# Patient Record
Sex: FEMALE | Race: WHITE | URBAN - METROPOLITAN AREA
[De-identification: names, ages, dates, MRNs, and addresses within clinical notes are randomized per-mention and may not be internally consistent; named-entity substitution may affect disease eponyms.]

---

## 2022-09-05 ENCOUNTER — EMERGENCY (EMERGENCY)
Facility: HOSPITAL | Age: 47
LOS: 1 days | Discharge: HOME | End: 2022-09-05
Attending: EMERGENCY MEDICINE
Payer: COMMERCIAL

## 2022-09-05 VITALS
DIASTOLIC BLOOD PRESSURE: 83 MMHG | OXYGEN SATURATION: 97 % | HEART RATE: 114 BPM | SYSTOLIC BLOOD PRESSURE: 137 MMHG | TEMPERATURE: 99 F | RESPIRATION RATE: 18 BRPM

## 2022-09-05 LAB
A1C WITH ESTIMATED AVERAGE GLUCOSE RESULT: 11.5 % — HIGH (ref 4–5.6)
ALBUMIN SERPL ELPH-MCNC: 3.5 G/DL — SIGNIFICANT CHANGE UP (ref 3.3–5.2)
ALP SERPL-CCNC: 76 U/L — SIGNIFICANT CHANGE UP (ref 40–120)
ALT FLD-CCNC: 12 U/L — SIGNIFICANT CHANGE UP
ANION GAP SERPL CALC-SCNC: 18 MMOL/L — HIGH (ref 5–17)
APTT BLD: 27.6 SEC — SIGNIFICANT CHANGE UP (ref 27.5–35.5)
AST SERPL-CCNC: 15 U/L — SIGNIFICANT CHANGE UP
BASOPHILS # BLD AUTO: 0.06 K/UL — SIGNIFICANT CHANGE UP (ref 0–0.2)
BASOPHILS NFR BLD AUTO: 0.5 % — SIGNIFICANT CHANGE UP (ref 0–2)
BILIRUB SERPL-MCNC: 1.2 MG/DL — SIGNIFICANT CHANGE UP (ref 0.4–2)
BUN SERPL-MCNC: 14.7 MG/DL — SIGNIFICANT CHANGE UP (ref 8–20)
CALCIUM SERPL-MCNC: 8.9 MG/DL — SIGNIFICANT CHANGE UP (ref 8.4–10.5)
CHLORIDE SERPL-SCNC: 92 MMOL/L — LOW (ref 98–107)
CO2 SERPL-SCNC: 19 MMOL/L — LOW (ref 22–29)
CREAT SERPL-MCNC: 0.75 MG/DL — SIGNIFICANT CHANGE UP (ref 0.5–1.3)
D DIMER BLD IA.RAPID-MCNC: 252 NG/ML DDU — HIGH
EGFR: 99 ML/MIN/1.73M2 — SIGNIFICANT CHANGE UP
EOSINOPHIL # BLD AUTO: 0.08 K/UL — SIGNIFICANT CHANGE UP (ref 0–0.5)
EOSINOPHIL NFR BLD AUTO: 0.7 % — SIGNIFICANT CHANGE UP (ref 0–6)
ESTIMATED AVERAGE GLUCOSE: 283 MG/DL — HIGH (ref 68–114)
GLUCOSE SERPL-MCNC: 345 MG/DL — HIGH (ref 70–99)
HCT VFR BLD CALC: 41.2 % — SIGNIFICANT CHANGE UP (ref 34.5–45)
HGB BLD-MCNC: 14.2 G/DL — SIGNIFICANT CHANGE UP (ref 11.5–15.5)
IMM GRANULOCYTES NFR BLD AUTO: 1.3 % — SIGNIFICANT CHANGE UP (ref 0–1.5)
INR BLD: 1.22 RATIO — HIGH (ref 0.88–1.16)
LIDOCAIN IGE QN: 13 U/L — LOW (ref 22–51)
LYMPHOCYTES # BLD AUTO: 1.31 K/UL — SIGNIFICANT CHANGE UP (ref 1–3.3)
LYMPHOCYTES # BLD AUTO: 11 % — LOW (ref 13–44)
MCHC RBC-ENTMCNC: 29.3 PG — SIGNIFICANT CHANGE UP (ref 27–34)
MCHC RBC-ENTMCNC: 34.5 GM/DL — SIGNIFICANT CHANGE UP (ref 32–36)
MCV RBC AUTO: 84.9 FL — SIGNIFICANT CHANGE UP (ref 80–100)
MONOCYTES # BLD AUTO: 0.78 K/UL — SIGNIFICANT CHANGE UP (ref 0–0.9)
MONOCYTES NFR BLD AUTO: 6.5 % — SIGNIFICANT CHANGE UP (ref 2–14)
NEUTROPHILS # BLD AUTO: 9.52 K/UL — HIGH (ref 1.8–7.4)
NEUTROPHILS NFR BLD AUTO: 80 % — HIGH (ref 43–77)
PLATELET # BLD AUTO: 195 K/UL — SIGNIFICANT CHANGE UP (ref 150–400)
POTASSIUM SERPL-MCNC: 3.8 MMOL/L — SIGNIFICANT CHANGE UP (ref 3.5–5.3)
POTASSIUM SERPL-SCNC: 3.8 MMOL/L — SIGNIFICANT CHANGE UP (ref 3.5–5.3)
PROT SERPL-MCNC: 7.1 G/DL — SIGNIFICANT CHANGE UP (ref 6.6–8.7)
PROTHROM AB SERPL-ACNC: 14.2 SEC — HIGH (ref 10.5–13.4)
RBC # BLD: 4.85 M/UL — SIGNIFICANT CHANGE UP (ref 3.8–5.2)
RBC # FLD: 12.9 % — SIGNIFICANT CHANGE UP (ref 10.3–14.5)
SODIUM SERPL-SCNC: 129 MMOL/L — LOW (ref 135–145)
TROPONIN T SERPL-MCNC: <0.01 NG/ML — SIGNIFICANT CHANGE UP (ref 0–0.06)
WBC # BLD: 11.91 K/UL — HIGH (ref 3.8–10.5)
WBC # FLD AUTO: 11.91 K/UL — HIGH (ref 3.8–10.5)

## 2022-09-05 PROCEDURE — 73501 X-RAY EXAM HIP UNI 1 VIEW: CPT | Mod: 26,RT

## 2022-09-05 PROCEDURE — 99285 EMERGENCY DEPT VISIT HI MDM: CPT

## 2022-09-05 PROCEDURE — 93010 ELECTROCARDIOGRAM REPORT: CPT

## 2022-09-05 RX ORDER — ONDANSETRON 8 MG/1
8 TABLET, FILM COATED ORAL ONCE
Refills: 0 | Status: COMPLETED | OUTPATIENT
Start: 2022-09-05 | End: 2022-09-05

## 2022-09-05 RX ORDER — SODIUM CHLORIDE 9 MG/ML
1000 INJECTION INTRAMUSCULAR; INTRAVENOUS; SUBCUTANEOUS ONCE
Refills: 0 | Status: COMPLETED | OUTPATIENT
Start: 2022-09-05 | End: 2022-09-05

## 2022-09-05 RX ORDER — ACETAMINOPHEN 500 MG
650 TABLET ORAL ONCE
Refills: 0 | Status: COMPLETED | OUTPATIENT
Start: 2022-09-05 | End: 2022-09-05

## 2022-09-05 RX ADMIN — Medication 650 MILLIGRAM(S): at 22:19

## 2022-09-05 RX ADMIN — Medication 600 MILLIGRAM(S): at 22:55

## 2022-09-05 RX ADMIN — Medication 650 MILLIGRAM(S): at 23:19

## 2022-09-05 RX ADMIN — Medication 100 MILLIGRAM(S): at 22:19

## 2022-09-05 RX ADMIN — SODIUM CHLORIDE 1000 MILLILITER(S): 9 INJECTION INTRAMUSCULAR; INTRAVENOUS; SUBCUTANEOUS at 23:35

## 2022-09-05 NOTE — ED PROVIDER NOTE - PATIENT PORTAL LINK FT
You can access the FollowMyHealth Patient Portal offered by [facility] by registering at the following website: [email or web address]. By joining [employer]’s FollowMyHealth portal, you will also be able to view your health information using other applications (apps) compatible with our system.

## 2022-09-05 NOTE — ED ADULT NURSE NOTE - OBJECTIVE STATEMENT
Pt reports to ED with cc of chest discomfort, states she has this fluttery feeling in her heart that happens once in awhile. Pt also reports redness and painful bump on her right labia majora x 2 days. AOX4

## 2022-09-05 NOTE — ED PROVIDER NOTE - CLINICAL SUMMARY MEDICAL DECISION MAKING FREE TEXT BOX
labial abscess was evaluated surgery and obgyn. Clindamycin IV 1st dose was performed in the ED. Vital stable. Will discharge with bactrim po, f/u with obgy.

## 2022-09-05 NOTE — CONSULT NOTE ADULT - SUBJECTIVE AND OBJECTIVE BOX
Surgery Consult    Consulting attending: Dr. Medina       HPI: 47 yo F with poorly controlled diabetes (Hg A1C 22, non-complaint with metformin) who presents with a two day hx of worsening R labial pain. She states that she noticed a bump in her groin that has groin in size, and began draining on today. She endorses subjective fever/chills. Denies any N&V, urinary complaints, CP, SOB. Afebrile, HDS.         PAST MEDICAL HISTORY:  Diabetes mellitus, type 2          PAST SURGICAL HISTORY:        MEDICATIONS:        ALLERGIES:  penicillin (Anaphylaxis)        VITALS & I/Os:  Vital Signs Last 24 Hrs  T(C): 37.2 (05 Sep 2022 20:38), Max: 37.2 (05 Sep 2022 20:38)  T(F): 98.9 (05 Sep 2022 20:38), Max: 98.9 (05 Sep 2022 20:38)  HR: 114 (05 Sep 2022 20:38) (114 - 114)  BP: 137/83 (05 Sep 2022 20:38) (137/83 - 137/83)  BP(mean): --  RR: 18 (05 Sep 2022 20:38) (18 - 18)  SpO2: 97% (05 Sep 2022 20:38) (97% - 97%)    Parameters below as of 05 Sep 2022 20:38  Patient On (Oxygen Delivery Method): room air        I&O's Summary        PHYSICAL EXAM:  Constitutional: patient resting comfortably in bed, in no acute distress  Respiratory: respirations are unlabored, no accessory muscle use, no conversational dyspnea  Gastrointestinal: Abdomen soft, non-tender, non-distended, no rebound tenderness / guarding  Groin: R labial swelling, active drainage of purulent fluid and inferior aspect, TTP   Neurological: A&O x 3; no gross sensory / motor / coordination deficits          LABS:                        14.2   11.91 )-----------( 195      ( 05 Sep 2022 21:30 )             41.2     09-05    129<L>  |  92<L>  |  14.7  ----------------------------<  345<H>  3.8   |  19.0<L>  |  0.75    Ca    8.9      05 Sep 2022 21:30    TPro  7.1  /  Alb  3.5  /  TBili  1.2  /  DBili  x   /  AST  15  /  ALT  12  /  AlkPhos  76  09-05    Lactate:    PT/INR - ( 05 Sep 2022 21:30 )   PT: 14.2 sec;   INR: 1.22 ratio         PTT - ( 05 Sep 2022 21:30 )  PTT:27.6 sec    CARDIAC MARKERS ( 05 Sep 2022 21:30 )  x     / <0.01 ng/mL / x     / x     / x                  IMAGING:                                                                                Surgery Consult    Consulting attending: Dr. Medina       HPI: 45 yo F with poorly controlled diabetes (Hg A1C 22, non-complaint with metformin) who presents with a two day hx of worsening R labial pain. She states that she noticed a bump in her groin that has groin in size, and began draining on today. She endorses subjective fever/chills. Denies any N&V, urinary complaints, CP, SOB. Afebrile, HDS.         PAST MEDICAL HISTORY:  Diabetes mellitus, type 2          PAST SURGICAL HISTORY:  Cholecystectomy       MEDICATIONS:  Metformin (non-compliant)         ALLERGIES:  penicillin (Anaphylaxis)        VITALS & I/Os:  Vital Signs Last 24 Hrs  T(C): 37.2 (05 Sep 2022 20:38), Max: 37.2 (05 Sep 2022 20:38)  T(F): 98.9 (05 Sep 2022 20:38), Max: 98.9 (05 Sep 2022 20:38)  HR: 114 (05 Sep 2022 20:38) (114 - 114)  BP: 137/83 (05 Sep 2022 20:38) (137/83 - 137/83)  BP(mean): --  RR: 18 (05 Sep 2022 20:38) (18 - 18)  SpO2: 97% (05 Sep 2022 20:38) (97% - 97%)    Parameters below as of 05 Sep 2022 20:38  Patient On (Oxygen Delivery Method): room air        I&O's Summary        PHYSICAL EXAM:  Constitutional: patient resting comfortably in bed, in no acute distress  Respiratory: respirations are unlabored, no accessory muscle use, no conversational dyspnea  Gastrointestinal: Abdomen soft, non-tender, non-distended, no rebound tenderness / guarding  Groin: R labial swelling, active drainage of purulent fluid and inferior aspect, TTP   Neurological: A&O x 3; no gross sensory / motor / coordination deficits          LABS:                        14.2   11.91 )-----------( 195      ( 05 Sep 2022 21:30 )             41.2     09-05    129<L>  |  92<L>  |  14.7  ----------------------------<  345<H>  3.8   |  19.0<L>  |  0.75    Ca    8.9      05 Sep 2022 21:30    TPro  7.1  /  Alb  3.5  /  TBili  1.2  /  DBili  x   /  AST  15  /  ALT  12  /  AlkPhos  76  09-05    Lactate:    PT/INR - ( 05 Sep 2022 21:30 )   PT: 14.2 sec;   INR: 1.22 ratio         PTT - ( 05 Sep 2022 21:30 )  PTT:27.6 sec    CARDIAC MARKERS ( 05 Sep 2022 21:30 )  x     / <0.01 ng/mL / x     / x     / x                  IMAGING:  CT recommended

## 2022-09-05 NOTE — ED PROVIDER NOTE - CARE PROVIDER_API CALL
Donato Diana)  Obstetrics and Gynecology  [address], [address]  [place] [address]  Phone: [phone]  Fax: [phone]  Follow Up Time:

## 2022-09-05 NOTE — ED PROVIDER NOTE - ATTENDING CONTRIBUTION TO CARE
47 yo F with hx of DM, non-compliant with meds  presents to ED c/o R groin/labial pain and swelling which started 2 days ago with tactile temp.  On exam pt awake and alert appears uncomfortable, afebrile,   Abd soft, obese, + large R labia abscess with spontaneous drainage.  Pt seen by Surgery initially, then GYN.  Cx taken and feels pt can be dc'd on po Bactrim, Sitz baths and f/u GYN/Dr. Martinez as outpt

## 2022-09-05 NOTE — ED ADULT TRIAGE NOTE - CHIEF COMPLAINT QUOTE
pt with chest fluttering and palpitations since this AM. hx of DM. pt with chronic  Leg pain and recent stressor this am which she attributes to sx. STAT EKG order.

## 2022-09-05 NOTE — ED PROVIDER NOTE - OBJECTIVE STATEMENT
Is a 47 yo F with PMH of T2DM, presents c/o palpitation and R groin pain. Palpitation started this morning suddenly, intermittent, lasts for "seconds", denies CP/n/radiation. Reports R groin pain and redness started 2 days ago, no discharge, denies trauma. Also reports HA and lightheaded for a few days. Reports Pt is not taking metformin for DM for the last 2 months due to social issue.

## 2022-09-05 NOTE — ED PROVIDER NOTE - NSFOLLOWUPINSTRUCTIONS_ED_ALL_ED_FT
Abscess    An abscess is an infected area that contains a collection of pus and debris. It can occur in almost any part of the body and occurs when the tissue gets infection. Symptoms include a painful mass that is red, warm, tender that might break open and HAVE drainage. If your health care provider gave you antibiotics make sure to take the full course and do not stop even if feeling better.     SEEK IMMEDIATE MEDICAL CARE IF YOU HAVE ANY OF THE FOLLOWING SYMPTOMS: chills, fever, muscle aches, or red streaking from the area.        Diabetes Mellitus and Skin Care      Diabetes, also called diabetes mellitus, can lead to skin problems. People with diabetes have a higher risk for many types of skin complications because poorly controlled blood sugar (glucose) levels can cause problems over time. These problems include:  •Damage to nerves. This can affect your ability to feel wounds, which means you may not notice minor skin injuries that could lead to serious problems. This can also decrease the amount that you sweat, causing dry skin that can break down.      •Damage to blood vessels. The lack of blood flow can cause skin to break down and can slow healing time, which can lead to infections.      •Areas of skin that become thick or discolored.        Common skin conditions    There are certain skin conditions that commonly affect people who have diabetes, such as:  •Dry skin.      •Thin skin. The skin on the feet may get thinner, break more easily, and heal more slowly than normal.    •Bacterial skin infections, including:  •Styes.      •Boils.      •Infected hair follicles.      •Infections of the skin around the nails.        •Fungal skin infections. These are most common in areas where skin rubs together, such as in the armpits or under the breasts.        Common skin changes    Diabetes can also cause the skin to change. You may develop:  •Dark, velvety markings on the skin that usually appear on the face, neck, armpits, inner thighs, and groin.      •Red, raised, scar-like tissue that may itch, feel painful, or develop into a wound.      •Blisters on the feet, toes, hands, or fingers.      •Thickened, wax-like areas of skin that usually occur on the hands, forehead, or toes.      •Brown or red, ring-shaped or half-ring-shaped patches of skin on the ears or fingers.      •Pea-shaped, yellow bumps that may be itchy and surrounded by a red ring. This usually affects the arms, feet, buttocks, and the top of the hands.      •Round, discolored patches of tan skin that do not hurt or itch. These may look like age spots.        General tips     Most skin problems can be prevented or treated easily if caught early. Talk with your health care provider if you have any concerns. General tips usually include:  •Check your skin every day for cuts, bruises, redness, blisters, or sores, especially on your feet. Tell your health care provider about any cuts, wounds, or sores that you have and if they are healing slowly.      •Keep your skin clean and dry. Do not use hot water.      •Moisturize your skin to prevent chapping.      •Do not scratch dry skin. Scratching dry skin can expose it to infection.      •Keep your blood glucose levels within target range.        Supplies needed:    •Mild soap or gentle skin cleanser.      •Lotion.        How to care for dry itchy skin    It is common for people with diabetes to have itchy skin caused by dryness. Frequent high glucose levels can cause itchiness, and poor circulation and certain skin infections can make dry, itchy skin worse. If you have itchy skin that is red or covered in a rash, this could be a sign of an allergic reaction.    If you have a rash or if your skin is very itchy, contact your health care provider. You may need help to manage your diabetes better, or you may need treatment for an infection. Untreated fungal infections can be dangerous because they allow more serious bacterial infections to enter the body.    To relieve dry skin and itching:  •Avoid very hot showers and baths.      •Use mild soap and gentle skin cleansers. Do not use soap that is perfumed or harsh or that dries your skin. Moisturizing soaps may help.      •Put on moisturizing lotion as soon as you finish bathing.        Follow these instructions at home:     •Schedule a foot exam with your health care provider once every year. This exam includes an inspection of the structure and skin of your feet.      •Make sure that your health care provider performs a visual foot exam at every medical visit.    •If you get a skin injury, such as a cut, blister, or sore, check the area every day for signs of infection. Check for:  •Redness, swelling, or pain.      •Fluid or blood.      •Warmth.      •Pus or a bad smell.        • Do not use any products that contain nicotine or tobacco. These products include cigarettes, chewing tobacco, and vaping devices, such as e-cigarettes. If you need help quitting, ask your health care provider.      •Take over-the-counter and prescription medicines only as told by your health care provider. This includes all diabetes medicines you are taking.        Where to find more information    •American Diabetes Association: www.diabetes.org      •Association of Diabetes Care & Education Specialists: www.diabeteseducator.org        Contact a health care provider if you:    •Develop a cut or sore, especially on your feet.      •Develop signs of infection after a skin injury.      •Have itchy skin that develops redness or a rash.      •Have discolored areas of skin.      •Have areas where your skin is changing, such as thickening or appearing shiny.        Summary    •People with diabetes have a higher risk for many types of skin complications. This is because poorly controlled blood sugar (glucose) levels can cause skin problems over time.      •Most skin problems can be prevented or treated easily if caught early. Keep your blood glucose levels within target range.      •Check your skin every day for cuts, bruises, redness, blisters, or sores, especially on your feet.      •Tell your health care provider about any cuts, wounds, or sores that you have and if they are healing slowly.      This information is not intended to replace advice given to you by your health care provider. Make sure you discuss any questions you have with your health care provider.

## 2022-09-06 PROCEDURE — 93005 ELECTROCARDIOGRAM TRACING: CPT

## 2022-09-06 PROCEDURE — 83036 HEMOGLOBIN GLYCOSYLATED A1C: CPT

## 2022-09-06 PROCEDURE — 84702 CHORIONIC GONADOTROPIN TEST: CPT

## 2022-09-06 PROCEDURE — 73501 X-RAY EXAM HIP UNI 1 VIEW: CPT

## 2022-09-06 PROCEDURE — 85730 THROMBOPLASTIN TIME PARTIAL: CPT

## 2022-09-06 PROCEDURE — 85610 PROTHROMBIN TIME: CPT

## 2022-09-06 PROCEDURE — 96375 TX/PRO/DX INJ NEW DRUG ADDON: CPT

## 2022-09-06 PROCEDURE — 87040 BLOOD CULTURE FOR BACTERIA: CPT

## 2022-09-06 PROCEDURE — 87070 CULTURE OTHR SPECIMN AEROBIC: CPT

## 2022-09-06 PROCEDURE — 83690 ASSAY OF LIPASE: CPT

## 2022-09-06 PROCEDURE — 99285 EMERGENCY DEPT VISIT HI MDM: CPT | Mod: 25

## 2022-09-06 PROCEDURE — 80053 COMPREHEN METABOLIC PANEL: CPT

## 2022-09-06 PROCEDURE — 84484 ASSAY OF TROPONIN QUANT: CPT

## 2022-09-06 PROCEDURE — 96365 THER/PROPH/DIAG IV INF INIT: CPT

## 2022-09-06 PROCEDURE — 85379 FIBRIN DEGRADATION QUANT: CPT

## 2022-09-06 PROCEDURE — 36415 COLL VENOUS BLD VENIPUNCTURE: CPT

## 2022-09-06 PROCEDURE — 99283 EMERGENCY DEPT VISIT LOW MDM: CPT | Mod: GC

## 2022-09-06 PROCEDURE — 85025 COMPLETE CBC W/AUTO DIFF WBC: CPT

## 2022-09-06 PROCEDURE — 96361 HYDRATE IV INFUSION ADD-ON: CPT

## 2022-09-06 RX ORDER — OXYCODONE HYDROCHLORIDE 5 MG/1
1 TABLET ORAL
Qty: 15 | Refills: 0
Start: 2022-09-06

## 2022-09-06 RX ADMIN — ONDANSETRON 8 MILLIGRAM(S): 8 TABLET, FILM COATED ORAL at 00:11

## 2022-09-06 NOTE — CONSULT NOTE ADULT - SUBJECTIVE AND OBJECTIVE BOX
JOAQUIN SAVAGE is a 46y  who presented to the ED for 2 days of right groin swelling and pain. The abscess drained a yellow discharge that is malodorous per the patient 2 hours prior to examination. She denies any systemic symptoms of fever, chills, headaches, chest pain, sob, nausea, vomiting. GYN consulted for evaluation of possible labial abscess.      OB history:   2012 SAB at 14wk  GYN history:   Chlamydia at age 16 treated; no other STD/STI hx  No abn pap smears  No established cared with GYN currently  No hx of ovarian cysts or fibroids on her uterus  Past medical history: type II DM  Past surgical history: [facility] cholecystectomy 15 years ago  Medications: metformin (has not taken in 2 months)  Allergies: PCN - anaphylaxis    Physical Exam  T(C): 37.2 (22 @ 20:38), Max: 37.2 (22 @ 20:38)  HR: 114 (22 @ 20:38) (114 - 114)  BP: 137/83 (22 @ 20:38) (137/83 - 137/83)  RR: 18 (22 @ 20:38) (18 - 18)  SpO2: 97% (22 @ 20:38) (97% - 97%)    General: alert and oriented x3, no acute distressally  Abdominal: no distension, no scarring, no bruising.  Non- tender to palpation in all 4 quadrants. No guarding, no rebound tenderness. No CVA tenderness  Pelvic: Right labia red and swollen with malodorous purulent drainage visible on sheets. 10/10 pain to light palpation.  Extremities: no swelling, redness or tenderness in the upper or lower extremities bilaterally.         Labs:                        14.2   11.91 )-----------( 195      ( 05 Sep 2022 21:30 )             41.2         129<L>  |  92<L>  |  14.7  ----------------------------<  345<H>  3.8   |  19.0<L>  |  0.75    Ca    8.9      05 Sep 2022 21:30    TPro  7.1  /  Alb  3.5  /  TBili  1.2  /  DBili  x   /  AST  15  /  ALT  12  /  AlkPhos  76      PT/INR - ( 05 Sep 2022 21:30 )   PT: 14.2 sec;   INR: 1.22 ratio         PTT - ( 05 Sep 2022 21:30 )  PTT:27.6 sec  HCG Quantitative, Serum: <4.0 mIU/mL (22 @ 21:30)

## 2022-09-06 NOTE — CONSULT NOTE ADULT - ASSESSMENT
JOAQUIN SAVAGE is a 46y  who presented to the ED for 2 days of right groin swelling and pain. GYN consulted for evaluation of possible labial abscess.    - VSS  - On examination, labial abscess is draining  - Given hx of uncontrolled DM, course of bactrim 180mg 7-10d   - OTC pain medications or per ED team  - Referred to Dr. Diana to establish GYN care    Discussed with Dr. Garcia
A: 47 yo F who presents with labial abscess.     Plan:   - Recommend gyn consultation for possible intervention   - IV antibiotics  - Strict BG control    - No acute intervention from [facility] Surgery perspective

## 2022-09-06 NOTE — CONSULT NOTE ADULT - ATTENDING COMMENTS
46y  who presented to the ED for 2 days of right groin abscess now actively draining purulent fluid with no s/s sepsis. Suggest Bactrim due to risk of MRSA, patient strongly encouraged to resume treatment of her diabetes.
Discussed care with resident prior to patients arrival.  Labial infections are in the spectrum of gynecology and therefore, the service will see the patient  This is not within scope  of practice of acute care surgery

## 2022-09-10 LAB
CULTURE RESULTS: SIGNIFICANT CHANGE UP
SPECIMEN SOURCE: SIGNIFICANT CHANGE UP

## 2022-09-10 NOTE — ED POST DISCHARGE NOTE - DETAILS
Pt call ed because she received a Official Letter regarding her Wound/Abscess culture. Pt was on bactrim and she feels a lot better . Abscess healed well.

## 2022-09-11 LAB
CULTURE RESULTS: SIGNIFICANT CHANGE UP
CULTURE RESULTS: SIGNIFICANT CHANGE UP
SPECIMEN SOURCE: SIGNIFICANT CHANGE UP
SPECIMEN SOURCE: SIGNIFICANT CHANGE UP

## 2022-09-28 NOTE — CHART NOTE - NSCHARTNOTEFT_GEN_A_CORE
Attempt made to contact patient regarding abscess culture results  Voicemail left, again.     Culture - Genital (09.06.22 @ 00:00)    Specimen Source: .Genital Genital Misc    Culture Results:   Numerous Streptococcus agalactiae (Group B) isolated  Group B streptococci are susceptible to ampicillin,  penicillin and cefazolin, but may be resistant to  erythromycin and clindamycin.  Recommendations for intrapartum prophylaxis for Group B  streptococci are penicillin or ampicillin.  Routine vaginal miguel.
Attempt made to contact patient regarding abscess culture results.  Voicemail left.    Culture - Genital (09.06.22 @ 00:00)    Specimen Source: .Genital Genital Misc    Culture Results:   Numerous Streptococcus agalactiae (Group B) isolated  Group B streptococci are susceptible to ampicillin,  penicillin and cefazolin, but may be resistant to  erythromycin and clindamycin.  Recommendations for intrapartum prophylaxis for Group B  streptococci are penicillin or ampicillin.  Routine vaginal miguel

## 2022-11-02 NOTE — ED PROVIDER NOTE - NEURO NEGATIVE STATEMENT, MLM
[de-identified] : RASHARD GALLEGOS is a 63 year old female who presents for initial evaluation of left knee. Reports she had a left knee meniscectomy in April 2021 with Dr. Ponce, did not relieve her knee pain. Underwent a series of Gel injections in 2021, no help. Following had a left TKR in Dec 2021 with Dr. Cornejo. Reports that her knee never felt better after TKR with continued tightness, swelling and intermittent sensations of instability. Reports a fall onto knees April 2022, she saw Dr Cornejo - xrays performed and no damage was seen,  she did have swelling and was treated with a Prednisone pack. Also reports intermittent numbness if she sits for a long time. Pain is dull aching with intermittent sharp pain, with associated swelling and a few episodes of near buckling and loss of motion Treat pain with Motrin. She has gone to PT but does not help.\par \par PMHx: cervical spondylosis(gets root ablations on her b/l neck annually), lumber disc disease(s/p fusion and injections), b/l carpal tunnel releases (2022), fibromyalgia  no loss of consciousness, no gait abnormality, no headache, no sensory deficits, and no weakness.

## 2022-11-11 NOTE — ED PROVIDER NOTE - EKG ADDITIONAL QUESTION - PERFORMED INDEPENDENT VISUALIZATION
DTR returned my call to schedule 6 month follow up. Patient currently in [facility] returning on the 16 th. Had an ER now has pacemaker. [facility]emaker clinic previously called by Lissa Wolf but has their office has not returned call at this time.
Pt daughter called to update Nurse on her mother and return call to [facility] #   [phone]
Yes

## 2023-05-08 NOTE — ED ADULT NURSE NOTE - TEMPLATE LIST FOR HEAD TO TOE ASSESSMENT
Neuro Calcipotriene Counseling:  I discussed with the patient the risks of calcipotriene including but not limited to erythema, scaling, itching, and irritation.